# Patient Record
Sex: MALE | URBAN - METROPOLITAN AREA
[De-identification: names, ages, dates, MRNs, and addresses within clinical notes are randomized per-mention and may not be internally consistent; named-entity substitution may affect disease eponyms.]

---

## 2024-08-26 ENCOUNTER — LAB OUTSIDE AN ENCOUNTER (OUTPATIENT)
Dept: URBAN - METROPOLITAN AREA CLINIC 17 | Facility: CLINIC | Age: 44
End: 2024-08-26

## 2024-08-26 ENCOUNTER — OFFICE VISIT (OUTPATIENT)
Dept: URBAN - METROPOLITAN AREA CLINIC 17 | Facility: CLINIC | Age: 44
End: 2024-08-26
Payer: OTHER GOVERNMENT

## 2024-08-26 ENCOUNTER — DASHBOARD ENCOUNTERS (OUTPATIENT)
Age: 44
End: 2024-08-26

## 2024-08-26 VITALS
HEIGHT: 67 IN | TEMPERATURE: 98.4 F | WEIGHT: 195 LBS | BODY MASS INDEX: 30.61 KG/M2 | SYSTOLIC BLOOD PRESSURE: 138 MMHG | DIASTOLIC BLOOD PRESSURE: 91 MMHG | HEART RATE: 87 BPM

## 2024-08-26 DIAGNOSIS — R13.19 ESOPHAGEAL DYSPHAGIA: ICD-10-CM

## 2024-08-26 DIAGNOSIS — K62.5 RECTAL BLEEDING: ICD-10-CM

## 2024-08-26 DIAGNOSIS — K92.1 BLACK STOOLS: ICD-10-CM

## 2024-08-26 DIAGNOSIS — K64.8 EXTERNAL HEMORRHOIDS: ICD-10-CM

## 2024-08-26 PROBLEM — 70861000119106: Status: ACTIVE | Noted: 2024-08-26

## 2024-08-26 PROBLEM — 440630006: Status: ACTIVE | Noted: 2024-08-26

## 2024-08-26 PROBLEM — 40890009: Status: ACTIVE | Noted: 2024-08-26

## 2024-08-26 PROBLEM — 30233002: Status: ACTIVE | Noted: 2024-08-26

## 2024-08-26 PROCEDURE — 99244 OFF/OP CNSLTJ NEW/EST MOD 40: CPT | Performed by: INTERNAL MEDICINE

## 2024-08-26 PROCEDURE — 99204 OFFICE O/P NEW MOD 45 MIN: CPT | Performed by: INTERNAL MEDICINE

## 2024-08-26 RX ORDER — OMEPRAZOLE 40 MG/1
1 CAPSULE 30 MINUTES BEFORE MORNING MEAL CAPSULE, DELAYED RELEASE ORAL ONCE A DAY
Status: ACTIVE | COMMUNITY

## 2024-08-26 RX ORDER — VONOPRAZAN FUMARATE 13.36 MG/1
1 TABLET TABLET ORAL ONCE A DAY
Qty: 90 TABLET | Refills: 0 | OUTPATIENT
Start: 2024-08-26 | End: 2024-11-23

## 2024-08-26 RX ORDER — ZOLPIDEM TARTRATE 10 MG/1
1 TABLET AT BEDTIME AS NEEDED TABLET, FILM COATED ORAL ONCE A DAY
Status: ACTIVE | COMMUNITY

## 2024-08-26 RX ORDER — CETIRIZINE HYDROCHLORIDE 10 MG/1
1 TABLET TABLET, FILM COATED ORAL ONCE A DAY
Status: ACTIVE | COMMUNITY

## 2024-08-26 RX ORDER — LINACLOTIDE 145 UG/1
1 CAPSULE AT LEAST 30 MINUTES BEFORE THE FIRST MEAL OF THE DAY ON AN EMPTY STOMACH CAPSULE, GELATIN COATED ORAL ONCE A DAY
Qty: 90 | Refills: 0 | OUTPATIENT
Start: 2024-08-26 | End: 2024-11-23

## 2024-08-26 NOTE — HPI-TODAY'S VISIT:
New patient 43-year-old male with PMH of GERD, IBS-C, migraines referred by Dr. Natali Monahan for evaluation of rectal bleeding, melena and GERD. He has hx of GERD for over 20 years. Currently takingOmeprazole 40 mg, Pantoprazole 20 mg and famotidine 40 mg with no relief of symptoms. He is alternating all these meds with Rolaids for breakthrough symptoms.  He saw an allergist for testing. Was placed on a PPI. He has not followed up for billing reasons.  He works at night. States he will eat around 8 pm, going into work and have regurgitation and HB. He also reports esophageal dysphagia/odynophagia to solids and liquids. Admits to some nausea. States he had an EGD a 1 year and a half ago with [facility] that was normal.  He states he is aware of the anti-reflux diet.  He saw 2 instances of black tarry stools a few weeks ago. Admits to Pepto Bismol use.  He also reports bright red blood with wiping intermittently and blood in the stool x 2 times in the past 6 months. Admits to some rectal pain and a swelling sensation with BMs. He has not tried anything for it.  Has IBS-C with diarrhea cycling. Can go up to 5 days without a BM. Denies FH of esophageal, stomach and colon cancer. Patient showed me his recent hgb with [facility] 06/19/2024, it was 15.

## 2024-10-15 ENCOUNTER — OFFICE VISIT (OUTPATIENT)
Dept: URBAN - METROPOLITAN AREA SURGERY CENTER 16 | Facility: SURGERY CENTER | Age: 44
End: 2024-10-15